# Patient Record
Sex: FEMALE | Race: AMERICAN INDIAN OR ALASKA NATIVE | URBAN - NONMETROPOLITAN AREA
[De-identification: names, ages, dates, MRNs, and addresses within clinical notes are randomized per-mention and may not be internally consistent; named-entity substitution may affect disease eponyms.]

---

## 2020-09-15 ENCOUNTER — OFFICE VISIT (OUTPATIENT)
Dept: URBAN - NONMETROPOLITAN AREA CLINIC 13 | Facility: CLINIC | Age: 30
End: 2020-09-15
Payer: COMMERCIAL

## 2020-09-15 DIAGNOSIS — R19.7 DIARRHEA, UNSPECIFIED TYPE: ICD-10-CM

## 2020-09-15 DIAGNOSIS — R11.14 BILIOUS VOMITING WITH NAUSEA: ICD-10-CM

## 2020-09-15 DIAGNOSIS — R10.13 EPIGASTRIC PAIN: ICD-10-CM

## 2020-09-15 PROCEDURE — G9903 PT SCRN TBCO ID AS NON USER: HCPCS | Performed by: INTERNAL MEDICINE

## 2020-09-15 PROCEDURE — 99204 OFFICE O/P NEW MOD 45 MIN: CPT | Performed by: INTERNAL MEDICINE

## 2020-09-15 PROCEDURE — G8427 DOCREV CUR MEDS BY ELIG CLIN: HCPCS | Performed by: INTERNAL MEDICINE

## 2020-09-15 PROCEDURE — G8420 CALC BMI NORM PARAMETERS: HCPCS | Performed by: INTERNAL MEDICINE

## 2020-09-15 RX ORDER — COLESTIPOL HYDROCHLORIDE 1 G/1
1 TABLET 30MINS PRIOR TO A MEAL TABLET, FILM COATED ORAL BID
Qty: 60 | Refills: 6 | OUTPATIENT
Start: 2020-09-15

## 2020-09-15 RX ORDER — DICYCLOMINE HYDROCHLORIDE 10 MG/1
1 TABLET 30 MINS PRIOR TO A MEAL FOR CRAMPING/DIARRHEA CAPSULE ORAL THREE TIMES A DAY
Qty: 90 TABLET | Refills: 6 | OUTPATIENT
Start: 2020-09-15 | End: 2021-04-13

## 2020-09-15 RX ORDER — AMLODIPINE BESYLATE 10 MG/1
1 TABLET TABLET ORAL ONCE A DAY
Qty: 30 | Status: ACTIVE | COMMUNITY
Start: 2020-09-15

## 2020-09-15 RX ORDER — OMEPRAZOLE 40 MG/1
1 CAPSULE 30 MINUTES BEFORE MEALS CAPSULE, DELAYED RELEASE ORAL BID
Qty: 180 | Refills: 11 | OUTPATIENT
Start: 2020-09-15

## 2020-09-15 NOTE — PHYSICAL EXAM GASTROINTESTINAL
Abdomen , soft, epigastric and right upper abdominal tenderness, nondistended , no guarding or rigidity , no masses palpable , normal bowel sounds , Liver and Spleen , no hepatosplenomegaly  Rectal deferred

## 2020-09-15 NOTE — HPI-TODAY'S VISIT:
Ms. Nakita Camara is a 30 year old female here for abdominal pain, nausea, vomiting, and diarrhea. She started having these issues ealier this year resulting in two trips to the ER. She was told it was either gastritis or GBD. She had an US with gallstones. She was given pepcid with no change. She had her GB out in June. She has continued to have pain, nausea, and vomiting. She will have diarrhea after eating certain foods- the last episode was with watermelon. She has started an OTC digestive enzyme and probiotic. This has helped some. Her symptoms are worse at night waking her from sleep. She has lost about 45 pounds. CS

## 2020-10-27 ENCOUNTER — OFFICE VISIT (OUTPATIENT)
Dept: URBAN - NONMETROPOLITAN AREA CLINIC 13 | Facility: CLINIC | Age: 30
End: 2020-10-27

## 2021-01-28 ENCOUNTER — OUT OF OFFICE VISIT (OUTPATIENT)
Dept: URBAN - METROPOLITAN AREA MEDICAL CENTER 1 | Facility: MEDICAL CENTER | Age: 31
End: 2021-01-28
Payer: COMMERCIAL

## 2021-01-28 DIAGNOSIS — R93.3 ABN FINDINGS-GI TRACT: ICD-10-CM

## 2021-01-28 DIAGNOSIS — D50.9 ANEMIA, IRON DEFICIENCY: ICD-10-CM

## 2021-01-28 DIAGNOSIS — R11.2 ACUTE NAUSEA WITH NONBILIOUS VOMITING: ICD-10-CM

## 2021-01-28 DIAGNOSIS — R19.7 ACUTE DIARRHEA: ICD-10-CM

## 2021-01-28 PROCEDURE — G8427 DOCREV CUR MEDS BY ELIG CLIN: HCPCS | Performed by: INTERNAL MEDICINE

## 2021-01-28 PROCEDURE — 99222 1ST HOSP IP/OBS MODERATE 55: CPT | Performed by: INTERNAL MEDICINE

## 2021-01-29 ENCOUNTER — OUT OF OFFICE VISIT (OUTPATIENT)
Dept: URBAN - METROPOLITAN AREA MEDICAL CENTER 1 | Facility: MEDICAL CENTER | Age: 31
End: 2021-01-29
Payer: COMMERCIAL

## 2021-01-29 DIAGNOSIS — K29.60 ADENOPAPILLOMATOSIS GASTRICA: ICD-10-CM

## 2021-01-29 DIAGNOSIS — R19.4 ALTERED BOWEL HABITS: ICD-10-CM

## 2021-01-29 DIAGNOSIS — R93.3 ABN FINDINGS-GI TRACT: ICD-10-CM

## 2021-01-29 DIAGNOSIS — K29.80 ACUTE DUODENITIS: ICD-10-CM

## 2021-01-29 PROCEDURE — 43239 EGD BIOPSY SINGLE/MULTIPLE: CPT | Performed by: INTERNAL MEDICINE

## 2021-01-29 PROCEDURE — 45380 COLONOSCOPY AND BIOPSY: CPT | Performed by: INTERNAL MEDICINE

## 2021-02-02 ENCOUNTER — OFFICE VISIT (OUTPATIENT)
Dept: URBAN - NONMETROPOLITAN AREA CLINIC 13 | Facility: CLINIC | Age: 31
End: 2021-02-02

## 2021-02-09 ENCOUNTER — OFFICE VISIT (OUTPATIENT)
Dept: URBAN - NONMETROPOLITAN AREA CLINIC 13 | Facility: CLINIC | Age: 31
End: 2021-02-09
Payer: COMMERCIAL

## 2021-02-09 VITALS
TEMPERATURE: 98.3 F | HEART RATE: 87 BPM | DIASTOLIC BLOOD PRESSURE: 89 MMHG | BODY MASS INDEX: 35.84 KG/M2 | HEIGHT: 71 IN | WEIGHT: 256 LBS | SYSTOLIC BLOOD PRESSURE: 123 MMHG

## 2021-02-09 DIAGNOSIS — R19.7 DIARRHEA, UNSPECIFIED TYPE: ICD-10-CM

## 2021-02-09 DIAGNOSIS — R11.14 BILIOUS VOMITING WITH NAUSEA: ICD-10-CM

## 2021-02-09 DIAGNOSIS — R10.13 EPIGASTRIC PAIN: ICD-10-CM

## 2021-02-09 DIAGNOSIS — D50.9 IRON DEFICIENCY ANEMIA, UNSPECIFIED IRON DEFICIENCY ANEMIA TYPE: ICD-10-CM

## 2021-02-09 PROCEDURE — G9903 PT SCRN TBCO ID AS NON USER: HCPCS | Performed by: NURSE PRACTITIONER

## 2021-02-09 PROCEDURE — G8420 CALC BMI NORM PARAMETERS: HCPCS | Performed by: NURSE PRACTITIONER

## 2021-02-09 PROCEDURE — 99213 OFFICE O/P EST LOW 20 MIN: CPT | Performed by: NURSE PRACTITIONER

## 2021-02-09 PROCEDURE — G8427 DOCREV CUR MEDS BY ELIG CLIN: HCPCS | Performed by: NURSE PRACTITIONER

## 2021-02-09 RX ORDER — OMEPRAZOLE 40 MG/1
1 CAPSULE 30 MINUTES BEFORE MEALS CAPSULE, DELAYED RELEASE ORAL BID
Qty: 180 | Refills: 11 | OUTPATIENT

## 2021-02-09 RX ORDER — DICYCLOMINE HYDROCHLORIDE 10 MG/1
1 TABLET 30 MINS PRIOR TO A MEAL FOR CRAMPING/DIARRHEA CAPSULE ORAL THREE TIMES A DAY
Qty: 90 TABLET | Refills: 6 | Status: ACTIVE | COMMUNITY
Start: 2020-09-15 | End: 2021-04-13

## 2021-02-09 RX ORDER — OMEPRAZOLE 40 MG/1
1 CAPSULE 30 MINUTES BEFORE MEALS CAPSULE, DELAYED RELEASE ORAL BID
Qty: 180 | Refills: 11 | Status: ACTIVE | COMMUNITY
Start: 2020-09-15

## 2021-02-09 RX ORDER — AMLODIPINE BESYLATE 10 MG/1
1 TABLET TABLET ORAL ONCE A DAY
Qty: 30 | Status: ACTIVE | COMMUNITY
Start: 2020-09-15

## 2021-02-09 RX ORDER — COLESTIPOL HYDROCHLORIDE 1 G/1
1 TABLET 30MINS PRIOR TO A MEAL TABLET, FILM COATED ORAL BID
Qty: 60 | Refills: 6 | Status: ACTIVE | COMMUNITY
Start: 2020-09-15

## 2021-02-09 NOTE — HPI-TODAY'S VISIT:
2/9/2021 Ms. Nakita Camara is here for f/u of abdominal pain, nausea, vomiting, and diarrhea. She started having these issues last year resulting in two trips to the ER. She had her GB out in June. She continued to have pain, nausea, and vomiting along with diarrhea. She was seen in September and started on omeprazole, bentyl and colestipol. We discussed endoscopy at that time but she did not have insurance so we held off. Bentyl did not help and colestipol helped for a few weeks and then made her nauseated. We discussed endoscopy at that time but she did not have insurance so we held off. She started an OTC probiotic and felt well until the end of January. She started having pain, nausea, vomiting, and diarrhea bringing her to the ER. She had labs with normal liver tests, elevated lipase of 270, and Hbg of 10. Stool studies were negative except for elevated WBCs. She had a CT scan with a normal pancreas and inflammation of the TI and mesenteric lymph nodes. She had an EGD with mild gastritis. Path showed villous blunting. Colonoscopy was normal with normal TI bx. She was treat with antibx. She completed thos last week. She currently is feeling well. She remains on reflux medication and probiotics. Her bowels are normal. She denies any further nausea or vomiting. CS

## 2021-02-09 NOTE — HPI-OTHER HISTORIES
9/15/2020 Ms. Nakita Camara is a 30 year old female here for abdominal pain, nausea, vomiting, and diarrhea. She started having these issues ealier this year resulting in two trips to the ER. She was told it was either gastritis or GBD. She had an US with gallstones. She was given pepcid with no change. She had her GB out in June. She has continued to have pain, nausea, and vomiting. She will have diarrhea after eating certain foods- the last episode was with watermelon. She has started an OTC digestive enzyme and probiotic. This has helped some. Her symptoms are worse at night waking her from sleep. She has lost about 45 pounds. CS

## 2021-02-18 LAB
DEAMIDATED GLIADIN ABS, IGA: 5
DEAMIDATED GLIADIN ABS, IGG: 3
ENDOMYSIAL ANTIBODY IGA: NEGATIVE
IMMUNOGLOBULIN A, QN, SERUM: 96
T-TRANSGLUTAMINASE (TTG) IGA: <2
T-TRANSGLUTAMINASE (TTG) IGG: 8

## 2021-02-23 ENCOUNTER — TELEPHONE ENCOUNTER (OUTPATIENT)
Dept: URBAN - METROPOLITAN AREA CLINIC 92 | Facility: CLINIC | Age: 31
End: 2021-02-23

## 2021-02-23 RX ORDER — COLESTIPOL HYDROCHLORIDE 1 G/1
1 TABLET 30MINS PRIOR TO A MEAL TABLET, FILM COATED ORAL BID
Qty: 60 | Refills: 6 | Status: ACTIVE | COMMUNITY
Start: 2020-09-15

## 2021-02-23 RX ORDER — AMLODIPINE BESYLATE 10 MG/1
1 TABLET TABLET ORAL ONCE A DAY
Qty: 30 | Status: ACTIVE | COMMUNITY
Start: 2020-09-15

## 2021-02-23 RX ORDER — OMEPRAZOLE 40 MG/1
1 CAPSULE 30 MINUTES BEFORE MEALS CAPSULE, DELAYED RELEASE ORAL BID
Qty: 180 | Refills: 11 | Status: ACTIVE | COMMUNITY

## 2021-02-23 RX ORDER — DICYCLOMINE HYDROCHLORIDE 10 MG/1
1 TABLET 30 MINS PRIOR TO A MEAL FOR CRAMPING/DIARRHEA CAPSULE ORAL THREE TIMES A DAY
Qty: 90 TABLET | Refills: 6 | Status: ACTIVE | COMMUNITY
Start: 2020-09-15 | End: 2021-04-13

## 2021-02-23 RX ORDER — LACTOBACILLUS RHAMNOSUS GG 10B CELL
AS DIRECTED CAPSULE ORAL DAILY
Qty: 30 | Refills: 11 | OUTPATIENT
Start: 2021-02-23 | End: 2022-02-18

## 2021-05-09 ENCOUNTER — LAB OUTSIDE AN ENCOUNTER (OUTPATIENT)
Dept: URBAN - NONMETROPOLITAN AREA CLINIC 13 | Facility: CLINIC | Age: 31
End: 2021-05-09

## 2021-05-11 ENCOUNTER — LAB OUTSIDE AN ENCOUNTER (OUTPATIENT)
Dept: URBAN - NONMETROPOLITAN AREA CLINIC 13 | Facility: CLINIC | Age: 31
End: 2021-05-11

## 2021-05-11 ENCOUNTER — OFFICE VISIT (OUTPATIENT)
Dept: URBAN - NONMETROPOLITAN AREA CLINIC 13 | Facility: CLINIC | Age: 31
End: 2021-05-11
Payer: COMMERCIAL

## 2021-05-11 ENCOUNTER — WEB ENCOUNTER (OUTPATIENT)
Dept: URBAN - NONMETROPOLITAN AREA CLINIC 13 | Facility: CLINIC | Age: 31
End: 2021-05-11

## 2021-05-11 DIAGNOSIS — R10.13 EPIGASTRIC PAIN: ICD-10-CM

## 2021-05-11 DIAGNOSIS — R11.14 BILIOUS VOMITING WITH NAUSEA: ICD-10-CM

## 2021-05-11 DIAGNOSIS — D50.9 IRON DEFICIENCY ANEMIA, UNSPECIFIED IRON DEFICIENCY ANEMIA TYPE: ICD-10-CM

## 2021-05-11 DIAGNOSIS — R19.7 DIARRHEA, UNSPECIFIED TYPE: ICD-10-CM

## 2021-05-11 DIAGNOSIS — K90.0 CELIAC DISEASE: ICD-10-CM

## 2021-05-11 PROCEDURE — 99214 OFFICE O/P EST MOD 30 MIN: CPT | Performed by: INTERNAL MEDICINE

## 2021-05-11 RX ORDER — COLESTIPOL HYDROCHLORIDE 1 G/1
1 TABLET 30MINS PRIOR TO A MEAL TABLET, FILM COATED ORAL BID
Qty: 60 | Refills: 6 | Status: ACTIVE | COMMUNITY
Start: 2020-09-15

## 2021-05-11 RX ORDER — OMEPRAZOLE 40 MG/1
1 CAPSULE 30 MINUTES BEFORE MEALS CAPSULE, DELAYED RELEASE ORAL BID
Qty: 180 | Refills: 11 | OUTPATIENT

## 2021-05-11 RX ORDER — AMLODIPINE BESYLATE 10 MG/1
1 TABLET TABLET ORAL ONCE A DAY
Qty: 30 | Status: ACTIVE | COMMUNITY
Start: 2020-09-15

## 2021-05-11 RX ORDER — OMEPRAZOLE 40 MG/1
1 CAPSULE 30 MINUTES BEFORE MEALS CAPSULE, DELAYED RELEASE ORAL BID
Qty: 180 | Refills: 11 | Status: ACTIVE | COMMUNITY

## 2021-05-11 RX ORDER — LACTOBACILLUS RHAMNOSUS GG 10B CELL
AS DIRECTED CAPSULE ORAL DAILY
Qty: 30 | Refills: 11 | Status: ACTIVE | COMMUNITY
Start: 2021-02-23 | End: 2022-02-18

## 2021-05-11 NOTE — HPI-TODAY'S VISIT:
5/11/2021 Ms. Nakita Camara is here for f/u of abdominal pain, nausea, vomiting, and diarrhea. In January, she had a CT scan with a normal pancreas and inflammation of the TI and mesenteric lymph nodes. She had an EGD with mild gastritis. Path showed villous blunting. Colonoscopy was normal with normal TI bx. She was treat with antibx. On of her celiac markers were weak positive. She has started a gluten free diet and all her symptoms have resolved. She denies any further nausea, vomiting, or diarrhea. She has had some exposures to gluten with return of symptoms but they resolved quickly. She is no longer taking any medications except a vitamin/iron supplement and her BP medication. She has more energy. Her weight is now stable. CS

## 2021-05-11 NOTE — HPI-OTHER HISTORIES
9/15/2020 Ms. Nakita Camara is a 30 year old female here for abdominal pain, nausea, vomiting, and diarrhea. She started having these issues ealier this year resulting in two trips to the ER. She was told it was either gastritis or GBD. She had an US with gallstones. She was given pepcid with no change. She had her GB out in June. She has continued to have pain, nausea, and vomiting. She will have diarrhea after eating certain foods- the last episode was with watermelon. She has started an OTC digestive enzyme and probiotic. This has helped some. Her symptoms are worse at night waking her from sleep. She has lost about 45 pounds. CS   2/9/2021 Ms. Nakita Camara is here for f/u of abdominal pain, nausea, vomiting, and diarrhea. She started having these issues last year resulting in two trips to the ER. She had her GB out in June. She continued to have pain, nausea, and vomiting along with diarrhea. She was seen in September and started on omeprazole, bentyl and colestipol. We discussed endoscopy at that time but she did not have insurance so we held off. Bentyl did not help and colestipol helped for a few weeks and then made her nauseated. We discussed endoscopy at that time but she did not have insurance so we held off. She started an OTC probiotic and felt well until the end of January. She started having pain, nausea, vomiting, and diarrhea bringing her to the ER. She had labs with normal liver tests, elevated lipase of 270, and Hbg of 10. Stool studies were negative except for elevated WBCs. She had a CT scan with a normal pancreas and inflammation of the TI and mesenteric lymph nodes. She had an EGD with mild gastritis. Path showed villous blunting. Colonoscopy was normal with normal TI bx. She was treat with antibx. She completed thos last week. She currently is feeling well. She remains on reflux medication and probiotics. Her bowels are normal. She denies any further nausea or vomiting. CS

## 2021-07-27 ENCOUNTER — OFFICE VISIT (OUTPATIENT)
Dept: URBAN - NONMETROPOLITAN AREA CLINIC 13 | Facility: CLINIC | Age: 31
End: 2021-07-27

## 2021-07-27 ENCOUNTER — DASHBOARD ENCOUNTERS (OUTPATIENT)
Age: 31
End: 2021-07-27

## 2021-07-27 PROBLEM — 87522002: Status: ACTIVE | Noted: 2021-02-09

## 2021-07-27 PROBLEM — 396331005: Status: ACTIVE | Noted: 2021-05-11

## 2021-07-27 RX ORDER — COLESTIPOL HYDROCHLORIDE 1 G/1
1 TABLET 30MINS PRIOR TO A MEAL TABLET, FILM COATED ORAL BID
Qty: 60 | Refills: 6 | Status: ACTIVE | COMMUNITY
Start: 2020-09-15

## 2021-07-27 RX ORDER — AMLODIPINE BESYLATE 10 MG/1
1 TABLET TABLET ORAL ONCE A DAY
Qty: 30 | Status: ACTIVE | COMMUNITY
Start: 2020-09-15

## 2021-07-27 RX ORDER — LACTOBACILLUS RHAMNOSUS GG 10B CELL
AS DIRECTED CAPSULE ORAL DAILY
Qty: 30 | Refills: 11 | Status: ACTIVE | COMMUNITY
Start: 2021-02-23 | End: 2022-02-18

## 2021-07-27 RX ORDER — OMEPRAZOLE 40 MG/1
1 CAPSULE 30 MINUTES BEFORE MEALS CAPSULE, DELAYED RELEASE ORAL BID
Qty: 180 | Refills: 11 | Status: ACTIVE | COMMUNITY

## 2021-07-27 RX ORDER — OMEPRAZOLE 40 MG/1
1 CAPSULE 30 MINUTES BEFORE MEALS CAPSULE, DELAYED RELEASE ORAL BID
Qty: 180 | Refills: 11 | OUTPATIENT

## 2021-07-27 NOTE — HPI-TODAY'S VISIT:
7/27/2021 Ms. Nakita Camara is here for f/u of abdominal pain, nausea, vomiting, and diarrhea. In January, she had a CT scan with a normal pancreas and inflammation of the TI and mesenteric lymph nodes. She had an EGD with mild gastritis. Path showed villous blunting. Colonoscopy was normal with normal TI bx. She was treat with antibx. On of her celiac markers were weak positive. After starting a gluten free diet all her symptoms resolved. A repeat CTE was ordered to prove resolution of inflammation of lymph nodes. She did not get this done. Her labs showed elevated CRP and Sed rate. Her Hbg had improved from 8 to 10. Her iron stores were normal at 19%. Today, she

## 2021-07-27 NOTE — HPI-OTHER HISTORIES
9/15/2020 Ms. Nakita Camara is a 30 year old female here for abdominal pain, nausea, vomiting, and diarrhea. She started having these issues ealier this year resulting in two trips to the ER. She was told it was either gastritis or GBD. She had an US with gallstones. She was given pepcid with no change. She had her GB out in June. She has continued to have pain, nausea, and vomiting. She will have diarrhea after eating certain foods- the last episode was with watermelon. She has started an OTC digestive enzyme and probiotic. This has helped some. Her symptoms are worse at night waking her from sleep. She has lost about 45 pounds. CS   2/9/2021 Ms. Nakita Camara is here for f/u of abdominal pain, nausea, vomiting, and diarrhea. She started having these issues last year resulting in two trips to the ER. She had her GB out in June. She continued to have pain, nausea, and vomiting along with diarrhea. She was seen in September and started on omeprazole, bentyl and colestipol. We discussed endoscopy at that time but she did not have insurance so we held off. Bentyl did not help and colestipol helped for a few weeks and then made her nauseated. We discussed endoscopy at that time but she did not have insurance so we held off. She started an OTC probiotic and felt well until the end of January. She started having pain, nausea, vomiting, and diarrhea bringing her to the ER. She had labs with normal liver tests, elevated lipase of 270, and Hbg of 10. Stool studies were negative except for elevated WBCs. She had a CT scan with a normal pancreas and inflammation of the TI and mesenteric lymph nodes. She had an EGD with mild gastritis. Path showed villous blunting. Colonoscopy was normal with normal TI bx. She was treat with antibx. She completed thos last week. She currently is feeling well. She remains on reflux medication and probiotics. Her bowels are normal. She denies any further nausea or vomiting. CS   5/11/2021 Ms. Nakita Camara is here for f/u of abdominal pain, nausea, vomiting, and diarrhea. In January, she had a CT scan with a normal pancreas and inflammation of the TI and mesenteric lymph nodes. She had an EGD with mild gastritis. Path showed villous blunting. Colonoscopy was normal with normal TI bx. She was treat with antibx. On of her celiac markers were weak positive. She has started a gluten free diet and all her symptoms have resolved. She denies any further nausea, vomiting, or diarrhea. She has had some exposures to gluten with return of symptoms but they resolved quickly. She is no longer taking any medications except a vitamin/iron supplement and her BP medication. She has more energy. Her weight is now stable. CS